# Patient Record
Sex: MALE | Race: AMERICAN INDIAN OR ALASKA NATIVE
[De-identification: names, ages, dates, MRNs, and addresses within clinical notes are randomized per-mention and may not be internally consistent; named-entity substitution may affect disease eponyms.]

---

## 2021-01-17 ENCOUNTER — HOSPITAL ENCOUNTER (EMERGENCY)
Dept: HOSPITAL 5 - ED | Age: 21
Discharge: HOME | End: 2021-01-17
Payer: SELF-PAY

## 2021-01-17 VITALS — SYSTOLIC BLOOD PRESSURE: 127 MMHG | DIASTOLIC BLOOD PRESSURE: 88 MMHG

## 2021-01-17 DIAGNOSIS — K04.7: Primary | ICD-10-CM

## 2021-01-17 DIAGNOSIS — Z79.899: ICD-10-CM

## 2021-01-17 PROCEDURE — 99282 EMERGENCY DEPT VISIT SF MDM: CPT

## 2021-01-17 NOTE — EMERGENCY DEPARTMENT REPORT
ED ENT HPI





- General


Chief complaint: Dental/Oral


Stated complaint: TOOTHACHE


Time Seen by Provider: 21 21:40


Source: patient


Mode of arrival: Ambulatory


Limitations: No Limitations





- History of Present Illness


Initial comments: 





28-year-old -American male presents to the emergency room complaining of 

a 2-day history of gum pain and swelling with drainage.  Patient states been 

taken Advil and ibuprofen last dose was 7 PM today.  Patient reports he is aware

that he has some bad teeth.  Patient states he has not seen a dentist in a 

couple years.  Patient reports his pain is a 10 out of 10.


Onset/Timin


-: days(s)


Location: tooth #


Severity: severe


Severity scale (0 -10): 10


Quality: aching, sharp


Consistency: constant


Improves with: none


Worsens with: none





- Related Data


                                  Previous Rx's











 Medication  Instructions  Recorded  Last Taken  Type


 


Chlorhexidine Mouthwash [Peridex] 15 ml MM BID #1 bottle 21 Unknown Rx


 


Clindamycin [Clindamycin CAP] 300 mg PO Q8H 10 Days #30 cap 21 Unknown Rx


 


Ibuprofen [Motrin 600 MG tab] 600 mg PO Q8H PRN #30 tablet 21 Unknown Rx











                                    Allergies











Allergy/AdvReac Type Severity Reaction Status Date / Time


 


No Known Allergies Allergy   Verified 21 20:45














ED Dental HPI





- General


Chief complaint: Dental/Oral


Stated complaint: TOOTHACHE


Time Seen by Provider: 21 21:40


Source: patient


Mode of arrival: Ambulatory


Limitations: No Limitations





- Related Data


                                  Previous Rx's











 Medication  Instructions  Recorded  Last Taken  Type


 


Chlorhexidine Mouthwash [Peridex] 15 ml MM BID #1 bottle 21 Unknown Rx


 


Clindamycin [Clindamycin CAP] 300 mg PO Q8H 10 Days #30 cap 21 Unknown Rx


 


Ibuprofen [Motrin 600 MG tab] 600 mg PO Q8H PRN #30 tablet 21 Unknown Rx











                                    Allergies











Allergy/AdvReac Type Severity Reaction Status Date / Time


 


No Known Allergies Allergy   Verified 21 20:45














ED Review of Systems


ROS: 


Stated complaint: TOOTHACHE


Other details as noted in HPI





Comment: All other systems reviewed and negative





ED Past Medical Hx





- Past Medical History


Previous Medical History?: No





- Surgical History


Past Surgical History?: No





- Social History


Smoking Status: Never Smoker


Substance Use Type: None





- Medications


Home Medications: 


                                Home Medications











 Medication  Instructions  Recorded  Confirmed  Last Taken  Type


 


Chlorhexidine Mouthwash [Peridex] 15 ml MM BID #1 bottle 21  Unknown Rx


 


Clindamycin [Clindamycin CAP] 300 mg PO Q8H 10 Days #30 cap 21  Unknown Rx


 


Ibuprofen [Motrin 600 MG tab] 600 mg PO Q8H PRN #30 tablet 21  Unknown Rx














ED Physical Exam





- General


Limitations: No Limitations


General appearance: alert, in no apparent distress





- Head


Head exam: Present: atraumatic, normocephalic





- Eye


Eye exam: Present: normal appearance





- ENT


ENT exam: Present: mucous membranes moist





- Expanded ENT Exam


  ** Expanded


Teeth exam: Present: dental caries, gingival enlargement, other (Teeth have 

brackets from braces but no wire.)





- Neck


Neck exam: Present: full ROM





- Respiratory


Respiratory exam: Absent: accessory muscle use





- Neurological Exam


Neurological exam: Present: alert, oriented X3, normal gait





- Psychiatric


Psychiatric exam: Present: normal affect, normal mood





- Skin


Skin exam: Present: warm, dry, intact, normal color.  Absent: rash





ED Course





                                   Vital Signs











  21





  20:38


 


Temperature 99.0 F


 


Pulse Rate 78


 


Respiratory 16





Rate 


 


Blood Pressure 127/88


 


O2 Sat by Pulse 99





Oximetry 














ED Medical Decision Making





- Medical Decision Making





28-year-old -American male presents to the emergency room complaining of 

a 2-day history of gum pain and swelling with drainage.  Patient states been 

taken Advil and ibuprofen last dose was 7 PM today.  Patient reports he is aware

that he has some bad teeth.  Patient states he has not seen a dentist in a 

couple years.  Patient reports his pain is a 10 out of 10.








Discussed with patient I will place him on clindamycin 300 mg p.o. every 8 hours

for 10 days, ibuprofen 600 mg p.o. every 8 hours as needed as needed for pain 

and Peridex mouthwash 15 mL p.o. twice a day for 10 days and to follow-up with a

dentist.


Critical care attestation.: 


If time is entered above; I have spent that time in minutes in the direct care 

of this critically ill patient, excluding procedure time.








ED Disposition


Clinical Impression: 


 Dental abscess





Disposition:  TO HOME OR SELFCARE


Is pt being admited?: No


Does the pt Need Aspirin: No


Condition: Stable


Instructions:  Dental Abscess, Easy-to-Read


Additional Instructions: 


Complete antibiotics as prescribed.  Take pain medication as needed and use 

mouthwash as prescribed.  Follow-up with a dentist.


Prescriptions: 


Clindamycin [Clindamycin CAP] 300 mg PO Q8H 10 Days #30 cap


Ibuprofen [Motrin 600 MG tab] 600 mg PO Q8H PRN #30 tablet


 PRN Reason: Pain


Chlorhexidine Mouthwash [Peridex] 15 ml MM BID #1 bottle


Referrals: 


PRIMARY CARE,MD [Primary Care Provider] - 3-5 Days


[facility] [Outside] - 3-5 Days


[facility] [Outside] - 3-5 Days


Forms:  Work/School Release Form(ED)